# Patient Record
Sex: FEMALE | Race: BLACK OR AFRICAN AMERICAN | NOT HISPANIC OR LATINO
[De-identification: names, ages, dates, MRNs, and addresses within clinical notes are randomized per-mention and may not be internally consistent; named-entity substitution may affect disease eponyms.]

---

## 2019-04-05 PROBLEM — Z00.00 ENCOUNTER FOR PREVENTIVE HEALTH EXAMINATION: Status: ACTIVE | Noted: 2019-04-05

## 2019-04-09 ENCOUNTER — APPOINTMENT (OUTPATIENT)
Dept: VASCULAR SURGERY | Facility: CLINIC | Age: 84
End: 2019-04-09
Payer: MEDICARE

## 2019-04-09 VITALS
WEIGHT: 148 LBS | HEART RATE: 78 BPM | DIASTOLIC BLOOD PRESSURE: 70 MMHG | HEIGHT: 64 IN | BODY MASS INDEX: 25.27 KG/M2 | SYSTOLIC BLOOD PRESSURE: 208 MMHG

## 2019-04-09 DIAGNOSIS — Z83.3 FAMILY HISTORY OF DIABETES MELLITUS: ICD-10-CM

## 2019-04-09 DIAGNOSIS — Z86.79 PERSONAL HISTORY OF OTHER DISEASES OF THE CIRCULATORY SYSTEM: ICD-10-CM

## 2019-04-09 DIAGNOSIS — Z86.39 PERSONAL HISTORY OF OTHER ENDOCRINE, NUTRITIONAL AND METABOLIC DISEASE: ICD-10-CM

## 2019-04-09 DIAGNOSIS — Z82.49 FAMILY HISTORY OF ISCHEMIC HEART DISEASE AND OTHER DISEASES OF THE CIRCULATORY SYSTEM: ICD-10-CM

## 2019-04-09 DIAGNOSIS — Z87.891 PERSONAL HISTORY OF NICOTINE DEPENDENCE: ICD-10-CM

## 2019-04-09 DIAGNOSIS — I65.29 OCCLUSION AND STENOSIS OF UNSPECIFIED CAROTID ARTERY: ICD-10-CM

## 2019-04-09 PROCEDURE — 93925 LOWER EXTREMITY STUDY: CPT

## 2019-04-09 PROCEDURE — 93880 EXTRACRANIAL BILAT STUDY: CPT

## 2019-04-09 PROCEDURE — 99204 OFFICE O/P NEW MOD 45 MIN: CPT

## 2019-04-09 RX ORDER — CLOPIDOGREL 75 MG/1
75 TABLET, FILM COATED ORAL
Refills: 0 | Status: ACTIVE | COMMUNITY

## 2019-04-09 RX ORDER — ASPIRIN 81 MG
81 TABLET, DELAYED RELEASE (ENTERIC COATED) ORAL
Refills: 0 | Status: ACTIVE | COMMUNITY

## 2019-04-09 RX ORDER — HYDROCHLOROTHIAZIDE 25 MG/1
25 TABLET ORAL
Refills: 0 | Status: ACTIVE | COMMUNITY

## 2019-04-09 RX ORDER — ATENOLOL 100 MG/1
100 TABLET ORAL
Refills: 0 | Status: ACTIVE | COMMUNITY

## 2019-04-09 RX ORDER — AMLODIPINE BESYLATE AND BENAZEPRIL HYDROCHLORIDE 10; 20 MG/1; MG/1
10-20 CAPSULE ORAL
Refills: 0 | Status: ACTIVE | COMMUNITY

## 2019-04-09 RX ORDER — EZETIMIBE 10 MG/1
TABLET ORAL
Refills: 0 | Status: ACTIVE | COMMUNITY

## 2019-04-09 RX ORDER — LOVASTATIN 40 MG/1
TABLET ORAL
Refills: 0 | Status: ACTIVE | COMMUNITY

## 2019-04-09 RX ORDER — INSULIN HUMAN 100 [IU]/ML
100 INJECTION, SUSPENSION SUBCUTANEOUS
Refills: 0 | Status: ACTIVE | COMMUNITY

## 2019-04-09 NOTE — HISTORY OF PRESENT ILLNESS
[FreeTextEntry1] : 82 yo female with a history of dm, htn, hld, carotid stenosis s/p b/l cea, renal artery stenosis s/p renal artery stent, pad with claudications s/p left lower extremity stent and possible bypass.  pt denies any history of stroke or stroke like symptoms.  she states that she has had 2 block claudications since the procedure of the left lower extremity.  pt denies any rest pain or lower extremity wounds.

## 2019-04-09 NOTE — PHYSICAL EXAM
[2+] : right 2+ [1+] : right 1+ [0] : left 0 [No Rash or Lesion] : No rash or lesion [Alert] : alert [Calm] : calm [Normal Breath Sounds] : Normal breath sounds [Normal Rate and Rhythm] : normal rate and rhythm [JVD] : no jugular venous distention  [] : not present [Varicose Veins Of Lower Extremities] : not present [Ankle Swelling (On Exam)] : not present [Skin Ulcer] : no ulcer [Abdomen Tenderness] : ~T ~M No abdominal tenderness [FreeTextEntry1] : b/l feet are warm  [de-identified] : appears well  [de-identified] : no facial asymetry, tongue is midline, eomi  [de-identified] : motor intact b/l upper and lower extremities, muscle strength 5/5 with dorsi and plantar flexion\par  [de-identified] : healed incisions over b/l groin and left medial thigh  [de-identified] : sensation intact b/l lower extremities

## 2019-04-09 NOTE — ASSESSMENT
[FreeTextEntry1] : 84 yo female with a history of dm, htn, hld, carotid stenosis s/p b/l cea, renal artery stenosis s/p renal artery stent, pad with claudications s/p left lower extremity stent and possible bypass\par carotid duplex shows no evidence of stenosis \par lower extremity duplex shows shows a right ax-fem with right to left fem-fem and left lower extremity fem-pop that is occluded \par pt will follow up for renal artery stent duplex and an arturo/pvr \par pt to continue current medications at this time

## 2019-04-10 ENCOUNTER — TRANSCRIPTION ENCOUNTER (OUTPATIENT)
Age: 84
End: 2019-04-10

## 2019-04-23 ENCOUNTER — APPOINTMENT (OUTPATIENT)
Dept: VASCULAR SURGERY | Facility: CLINIC | Age: 84
End: 2019-04-23
Payer: MEDICARE

## 2019-04-23 VITALS
WEIGHT: 148 LBS | TEMPERATURE: 98 F | HEART RATE: 70 BPM | SYSTOLIC BLOOD PRESSURE: 148 MMHG | BODY MASS INDEX: 25.27 KG/M2 | DIASTOLIC BLOOD PRESSURE: 64 MMHG | HEIGHT: 64 IN

## 2019-04-23 PROCEDURE — 99212 OFFICE O/P EST SF 10 MIN: CPT

## 2019-04-23 PROCEDURE — 93975 VASCULAR STUDY: CPT

## 2019-04-23 PROCEDURE — 93923 UPR/LXTR ART STDY 3+ LVLS: CPT

## 2019-04-23 NOTE — ASSESSMENT
[FreeTextEntry1] : 82 yo female with a history of dm, htn, hld, carotid stenosis s/p b/l cea, renal artery stenosis s/p renal artery stent, pad with 1/4 mile claudications s/p right ax-fem with thrombosed right to left fem-fem and thrombosed left lower extremity fem-pop that is occluded \par renal artery duplex shows high resistant flow within the kidneys bilaterally unable to visualize the origin of the right renal artery.  \par arturo/pvr shows severe disease bilaterally given pt only symptoms are claudications would continue with medical management and close monitor\par pt to follow up in 6 months with repeat carotid duplex \par given bp has improved with medication change would continue to optimize medication regiment and monitor blood pressure would not intervene on the renal arteries given the disease is also within the kidneys and distal renal artery.  \par \par discussed above with dr rueda

## 2019-04-23 NOTE — HISTORY OF PRESENT ILLNESS
[FreeTextEntry1] : 84 yo female with a history of dm, htn, hld, carotid stenosis s/p b/l cea, renal artery stenosis s/p renal artery stent, pad with claudications s/p multiple bypasses in the past (right ax - fem, right to left fem-fem and left fem-pop). \par pt states that she is able to walk about 1/2 mile and stops to rest about 2 times (after 1/4 of a mile)\par pt denies any rest pain or lower extremity ulcerations \par pt denies any history of renal dysfunction but states that she is scheduled to have blood work this week\par her pcp has readjusted her bp medications and states that the bp has been improving.

## 2019-04-23 NOTE — PHYSICAL EXAM
[2+] : right 2+ [1+] : right 1+ [0] : left 0 [No Rash or Lesion] : No rash or lesion [Alert] : alert [Calm] : calm [JVD] : no jugular venous distention  [Varicose Veins Of Lower Extremities] : not present [Ankle Swelling (On Exam)] : not present [] : not present [Skin Ulcer] : no ulcer [FreeTextEntry1] : b/l feet are warm  [de-identified] : appears well  [de-identified] : motor intact b/l lower extremities

## 2019-04-23 NOTE — CONSULT LETTER
[Dear  ___] : Dear  [unfilled], [Consult Letter:] : I had the pleasure of evaluating your patient, [unfilled]. [Please see my note below.] : Please see my note below. [Sincerely,] : Sincerely, [FreeTextEntry3] : Rosalia Byrd PA-C\[facility] [place]\par

## 2019-10-22 ENCOUNTER — APPOINTMENT (OUTPATIENT)
Dept: VASCULAR SURGERY | Facility: CLINIC | Age: 84
End: 2019-10-22

## 2021-01-01 ENCOUNTER — EMERGENCY (EMERGENCY)
Facility: HOSPITAL | Age: 86
LOS: 0 days | End: 2021-09-16
Attending: STUDENT IN AN ORGANIZED HEALTH CARE EDUCATION/TRAINING PROGRAM
Payer: MEDICARE

## 2021-01-01 VITALS — HEIGHT: 67 IN | WEIGHT: 169.98 LBS

## 2021-01-01 VITALS — SYSTOLIC BLOOD PRESSURE: 121 MMHG | HEART RATE: 101 BPM | DIASTOLIC BLOOD PRESSURE: 103 MMHG | TEMPERATURE: 96 F

## 2021-01-01 DIAGNOSIS — I46.9 CARDIAC ARREST, CAUSE UNSPECIFIED: ICD-10-CM

## 2021-01-01 PROCEDURE — 99053 MED SERV 10PM-8AM 24 HR FAC: CPT

## 2021-01-01 PROCEDURE — 99291 CRITICAL CARE FIRST HOUR: CPT

## 2021-09-16 NOTE — ED ADULT NURSE REASSESSMENT NOTE - NS ED NURSE REASSESS COMMENT FT1
cardiac arrest pt received with faina airway, iv line to left ac and IO to left leg, cpr in progress,  faina airway d/c, ETube 7.5/lip 24 was inserted by me and RT, expiration was called by md at 12.4am

## 2021-09-16 NOTE — ED PROVIDER NOTE - PHYSICAL EXAMINATION
Vital signs: Reviewed by me and confirmed.   General: Unresponsive. No evidence of trauma. (+) bright red blood coming up from airway  HEAD: No apparent trauma. NC/AT.  NECK: Supple, no step offs, no JVD.  Respiratory: No spontaneous breath movements.  CV: No spontaneous pulse. No distal pulses b/l.   ABD: Soft, ND, not rigid, no obvious signs of trauma.  SKIN: Intact. No ecchymosis, no rash, no obvious trauma.  Neuro: Pupils fixed.

## 2021-09-16 NOTE — ED PROVIDER NOTE - PROGRESS NOTE DETAILS
The patient had no spontaneous respirations, heart sounds, or response to any stimulus including noxious stimuli. The pupils were fixed and dilated at 8mm and with no response to light, no corneal reflexes, no gag reflex, no oculocephalic reflex. Patient was pronounced at 1224AM. The patients family was contacted and did not desire an autopsy,  services were offered, and  arrangements were discussed. spoke w/ grand-daughter, discussed with her and  about resuscitation and death. discussed with ME.

## 2021-09-16 NOTE — ED ADULT TRIAGE NOTE - CHIEF COMPLAINT QUOTE
cardiac arrest ACLS on progress , pt asystole 4 epi given by ems left lower leg IO , unwitnessed arrest , on Perez airway

## 2021-09-16 NOTE — ED PROVIDER NOTE - OBJECTIVE STATEMENT
85F PMHx of HTN, DM, asa/plavix presenting with cardiac arrest today. Historian is granddaughter, reporting that patient was having abdominal pain over past day. Was checking in on her every 30 minutes. Found her unresponsive. EMS called, arrived, intubated w/ faina tube due to large amount of blood in airway, CPR on cresencio, 4 x epi, asystole initial rhythm. In the ED, faina tube switched to ETT, noted frothy bright red blood in airway. epi x 2 given. US showing cardiac standstill x 2 rounds.

## 2021-09-16 NOTE — ED PROVIDER NOTE - CLINICAL SUMMARY MEDICAL DECISION MAKING FREE TEXT BOX
Initial Rhythm:  PEA     Cardiac compressions was performed by BROOK device  in order to sustain blood flow. The patient was intubated by EMS w/ faina tube due to difficult airway w/ blood. (+) End tidal; in the ED, switched to ETT, noticed blood in frothy bright red blood in airway. ventilated, and oxygenated. The patient received appropriate ACLS measures and these were repeated as necessary throughout the resuscitation. CPR was performed under my direct supervision and guidance.     See nursing note for medications and times given.

## 2021-09-22 LAB — GLUCOSE BLDC GLUCOMTR-MCNC: 61 MG/DL — LOW (ref 70–99)
